# Patient Record
Sex: MALE | Race: WHITE | URBAN - METROPOLITAN AREA
[De-identification: names, ages, dates, MRNs, and addresses within clinical notes are randomized per-mention and may not be internally consistent; named-entity substitution may affect disease eponyms.]

---

## 2018-07-06 ENCOUNTER — OFFICE VISIT (OUTPATIENT)
Dept: INTERNAL MEDICINE | Facility: CLINIC | Age: 53
End: 2018-07-06
Payer: COMMERCIAL

## 2018-07-06 VITALS
RESPIRATION RATE: 20 BRPM | HEART RATE: 61 BPM | BODY MASS INDEX: 25.07 KG/M2 | SYSTOLIC BLOOD PRESSURE: 114 MMHG | HEIGHT: 69 IN | DIASTOLIC BLOOD PRESSURE: 60 MMHG | WEIGHT: 169.3 LBS | TEMPERATURE: 97.5 F | OXYGEN SATURATION: 100 %

## 2018-07-06 DIAGNOSIS — Z00.00 ENCOUNTER FOR ROUTINE ADULT HEALTH EXAMINATION WITHOUT ABNORMAL FINDINGS: Primary | ICD-10-CM

## 2018-07-06 DIAGNOSIS — M25.511 RIGHT SHOULDER PAIN, UNSPECIFIED CHRONICITY: ICD-10-CM

## 2018-07-06 PROCEDURE — 80048 BASIC METABOLIC PNL TOTAL CA: CPT | Performed by: INTERNAL MEDICINE

## 2018-07-06 PROCEDURE — G0103 PSA SCREENING: HCPCS | Performed by: INTERNAL MEDICINE

## 2018-07-06 PROCEDURE — 99396 PREV VISIT EST AGE 40-64: CPT | Performed by: INTERNAL MEDICINE

## 2018-07-06 PROCEDURE — 36415 COLL VENOUS BLD VENIPUNCTURE: CPT | Performed by: INTERNAL MEDICINE

## 2018-07-06 PROCEDURE — 80061 LIPID PANEL: CPT | Performed by: INTERNAL MEDICINE

## 2018-07-06 PROCEDURE — 99213 OFFICE O/P EST LOW 20 MIN: CPT | Mod: 25 | Performed by: INTERNAL MEDICINE

## 2018-07-06 NOTE — PATIENT INSTRUCTIONS
PREVENTIVE HEALTH RECOMMENDATIONS:     Vaccines: Get a flu shot each year. Get a tetanus shot every 10 years.     Exercise for at least 150 minutes a week (an average of 30 minutes a day, 5 days of the week). This will help you control your weight and prevent disease.    Limit alcohol to one drink per day.    No smoking.     Wear sunscreen to prevent skin cancer.     See your dentist twice a year for an exam and cleaning.    Try to get Calcium 1000 mg total per day. It is best to not take it all at once. Try to get Vitamin D at least 7961-8432 units per day.    BMI or Body Mass Index is a way of indicating weight and health risk for cardiovascular diseases, high blood pressure, diabetes.   Definitions:    Underweight is less than 18.5 and will be associated with health risk.   Normal BMI is 18.5 to 25   Overweight is 25-29   Obesity is 30 or greater   Morbid Obesity is 40 or greater or 35 or greater with diabetes, prediabetes or abnormal blood sugar, high blood pressure or elevated cholesterol  Obesity and Morbid Obesity are associated with higher health risks. Lowering calories, exercising more may lower your BMI and even small decreases can have positive impact on lowering health risks.   Your Body mass index is 25.37 kg/(m^2)..,

## 2018-07-06 NOTE — MR AVS SNAPSHOT
After Visit Summary   7/6/2018    Prashanth Looney    MRN: [id number]           Patient Information     Date Of Birth          1965        Visit Information        Provider Department      7/6/2018 11:00 AM Dana Da Silva MD [facility]        Today's Diagnoses     Encounter for routine adult health examination without abnormal findings    -  1      Care Instructions      PREVENTIVE HEALTH RECOMMENDATIONS:     Vaccines: Get a flu shot each year. Get a tetanus shot every 10 years.     Exercise for at least 150 minutes a week (an average of 30 minutes a day, 5 days of the week). This will help you control your weight and prevent disease.    Limit alcohol to one drink per day.    No smoking.     Wear sunscreen to prevent skin cancer.     See your dentist twice a year for an exam and cleaning.    Try to get Calcium 1000 mg total per day. It is best to not take it all at once. Try to get Vitamin D at least 5313-4121 units per day.    BMI or Body Mass Index is a way of indicating weight and health risk for cardiovascular diseases, high blood pressure, diabetes.   Definitions:    Underweight is less than 18.5 and will be associated with health risk.   Normal BMI is 18.5 to 25   Overweight is 25-29   Obesity is 30 or greater   Morbid Obesity is 40 or greater or 35 or greater with diabetes, prediabetes or abnormal blood sugar, high blood pressure or elevated cholesterol  Obesity and Morbid Obesity are associated with higher health risks. Lowering calories, exercising more may lower your BMI and even small decreases can have positive impact on lowering health risks.   Your Body mass index is 25.37 kg/(m^2)..,              Follow-ups after your visit        Who to contact     If you have questions or need follow up information about today's clinic visit or your schedule please contact [facility] directly at [phone].  Normal or non-critical lab and imaging results will be  "communicated to you by MyChart, letter or phone within 4 business days after the clinic has received the results. If you do not hear from us within 7 days, please contact the clinic through MyChart or phone. If you have a critical or abnormal lab result, we will notify you by phone as soon as possible.  Submit refill requests through [employer] or call your pharmacy and they will forward the refill request to us. Please allow 3 business days for your refill to be completed.          Additional Information About Your Visit        Care EveryWhere ID     This is your Care EveryWhere ID. This could be used by other organizations to access your [place] medical records  [id number]        Your Vitals Were     Pulse Temperature Respirations Height Pulse Oximetry BMI (Body Mass Index)    61 97.5  F (36.4  C) (Oral) 20 5' 8.5\" (1.74 m) 100% 25.37 kg/m2       Blood Pressure from Last 3 Encounters:   07/06/18 114/60   08/30/16 110/70   07/06/15 90/50    Weight from Last 3 Encounters:   07/06/18 169 lb 4.8 oz (76.8 kg)   08/30/16 160 lb (72.6 kg)   07/06/15 171 lb 8 oz (77.8 kg)              We Performed the Following     Basic metabolic panel     Lipid panel reflex to direct LDL Fasting     PSA, screen        Primary Care Provider Fax #    Physician No Ref-Primary [phone]       No address on file        Equal Access to Services     MADI MILLER : Hadii bolivar contreras Solitzy, waaxda luqadaha, qaybta kaalmaranda corbett . So [facility] [phone].    ATENCIÓN: Si habla español, tiene a marie disposición servicios gratuitos de asistencia lingüística. Hayley al [phone].    We comply with applicable federal civil rights laws and [place] laws. We do not discriminate on the basis of race, color, national origin, age, disability, sex, sexual orientation, or gender identity.            Thank you!     Thank you for choosing [facility]  for your care. Our goal is always to " provide you with excellent care. Hearing back from our patients is one way we can continue to improve our services. Please take a few minutes to complete the written survey that you may receive in the mail after your visit with us. Thank you!             Your Updated Medication List - Protect others around you: Learn how to safely use, store and throw away your medicines at www.disposemymeds.org.          This list is accurate as of 7/6/18 12:16 PM.  Always use your most recent med list.                   Brand Name Dispense Instructions for use Diagnosis    tadalafil 5 MG tablet    CIALIS    6 tablet    Take 1 tablet (5 mg) by mouth as needed for erectile dysfunction    Erectile dysfunction due to arterial insufficiency

## 2018-07-06 NOTE — PROGRESS NOTES
SUBJECTIVE:   CC: Prashanth Looney is an 53 year old male who presents for preventative health visit.     Physical   Annual:     Getting at least 3 servings of Calcium per day:  Yes    Bi-annual eye exam:  Yes    Dental care twice a year:  Yes    Sleep apnea or symptoms of sleep apnea:  None    Diet:  Regular (no restrictions)    Frequency of exercise:  4-5 days/week    Duration of exercise:  30-45 minutes    Taking medications regularly:  Yes    Medication side effects:  Not applicable    Additional concerns today:  No    Current concerns:  Right shoulder pain: Reports he has had pain in the right shoulder for for 5 months.  This is most often mild to moderate aching but sometimes can be a sharp pain, especially with pushing, lifting.  He had no specific injury.  He does fish a lot but it does not seem to particularly hurt while he is doing his fishing.  It occasionally can bother him at night when turning over.  It does not radiate into the arm, no associated neck pain.      Today's PHQ-2 Score:   PHQ-2 ( 1999 [employer]) 7/6/2018   Q1: Little interest or pleasure in doing things 0   Q2: Feeling down, depressed or hopeless 0   PHQ-2 Score 0   Q1: Little interest or pleasure in doing things Not at all   Q2: Feeling down, depressed or hopeless Not at all   PHQ-2 Score 0       Abuse: Current or Past(Physical, Sexual or Emotional)- No  Do you feel safe in your environment - Yes    Social History   Substance Use Topics     Smoking status: Never Smoker     Smokeless tobacco: Never Used     Alcohol use Yes      Comment: occ     Alcohol Use 7/6/2018   If you drink alcohol do you typically have greater than 3 drinks per day OR greater than 7 drinks per week? No       Last PSA:   PSA   Date Value Ref Range Status   08/30/2016 0.18 0 - 4 ug/L Final     Comment:     Assay Method:  Chemiluminescence using Siemens Vista analyzer       Reviewed orders with patient. Reviewed health maintenance and updated orders accordingly -  "Yes      Reviewed and updated as needed this visit by clinical staff  Tobacco  Allergies  Meds  Med Hx  Surg Hx  Fam Hx  Soc Hx        Reviewed and updated as needed this visit by Provider            Review of Systems        Physical Exam    Patient Active Problem List   Diagnosis     Hyperlipidemia LDL goal <130     Benign neoplasm of sigmoid colon     ED (erectile dysfunction)     Current Outpatient Prescriptions   Medication Sig Dispense Refill     tadalafil (CIALIS) 5 MG tablet Take 1 tablet (5 mg) by mouth as needed for erectile dysfunction (Patient not taking: Reported on 7/6/2018) 6 tablet 11        Review Of Systems  Skin: Negative, he sees Dr. Salinas regularly  Eyes: negative  Ears/Nose/Throat: negative  Respiratory: No dyspnea on exertion and No cough  Cardiovascular: negative  Gastrointestinal: negative  Genitourinary: negative  Musculoskeletal: as above  Neurologic: negative  Psychiatric: negative  Hematologic/Lymphatic/Immunologic: negative  Endocrine: negative      Objective:  Patient alert, in no acute distress  /60 (BP Location: Left arm, Patient Position: Sitting, Cuff Size: Adult Regular)  Pulse 61  Temp 97.5  F (36.4  C) (Oral)  Resp 20  Ht 5' 8.5\" (1.74 m)  Wt 169 lb 4.8 oz (76.8 kg)  SpO2 100%  BMI 25.37 kg/m2    HEENT: extraocular movements are intact, pupils equal and reactive to light and accommodation, TMs clear, oropharynx clear  NECK: Neck supple. No adenopathy. Thyroid symmetric, normal size,, Carotids without bruits.  PULMONARY: clear to auscultation  CARDIAC: S1, S2 normal, no murmur, rub or gallop, regular rate and rhythm  PULSES: 2/2 throughout  ABDOMINAL: Soft, nontender.  Normal bowel sounds.  No hepatosplenomegaly or abnormal masses  BREAST: male breasts are normal without masses  RECTAL: Normal sphincter tone, no masses, prostate mildly enlarged, without nodules  REFLEXES: 2+ throughout  SKIN unremarkable     Right shoulder: There is some tenderness at the " "supraspinatus tendon insertion, no anterior or posterior tenderness.  No tenderness of the AC joint.  Range of motion is full with very minimal pain at full abduction.  Rotation strength is normal, rest of the muscles are with good normal strength.      ASSESSMENT/PLAN:   1. Encounter for routine adult health examination without abnormal findings  He may need to have a form completed for biometric screening, advised that if a form is required for me to sign he will need to bring it in and sign a release of information  - Basic metabolic panel  - Lipid panel reflex to direct LDL Fasting  - PSA, screen    2. Right shoulder pain, unspecified chronicity  Likely this represents some tendinitis/impingement.  Advised strength is good so do not suspect rotator cuff tear, also there was no significant injury.  Advised his activities could have an effect on this.  He does not feels bad enough to consider orthopedic evaluation or injection at this time so given some handouts with shoulder exercises, advised if the exercises hurt to decrease use.  If he has worsening or persistent, he can call for referral to orthopedics.      COUNSELING:   Reviewed preventive health counseling, as reflected in patient instructions    BP Readings from Last 1 Encounters:   07/06/18 114/60     Estimated body mass index is 25.74 kg/(m^2) as calculated from the following:    Height as of this encounter: 5' 8\" (1.727 m).    Weight as of this encounter: 169 lb 4.8 oz (76.8 kg).           reports that he has never smoked. He has never used smokeless tobacco.      Counseling Resources:  ATP IV Guidelines  Pooled Cohorts Equation Calculator  FRAX Risk Assessment  ICSI Preventive Guidelines  Dietary Guidelines for Americans, 2010  USDA's MyPlate  ASA Prophylaxis  Lung CA Screening    Dana Da Silva MD  [facility]  Answers for HPI/ROS submitted by the patient on 7/6/2018   PHQ-2 Score: 0    "

## 2018-07-07 LAB
ANION GAP SERPL CALCULATED.3IONS-SCNC: 10 MMOL/L (ref 3–14)
BUN SERPL-MCNC: 11 MG/DL (ref 7–30)
CALCIUM SERPL-MCNC: 8.9 MG/DL (ref 8.5–10.1)
CHLORIDE SERPL-SCNC: 106 MMOL/L (ref 94–109)
CHOLEST SERPL-MCNC: 213 MG/DL
CO2 SERPL-SCNC: 23 MMOL/L (ref 20–32)
CREAT SERPL-MCNC: 0.71 MG/DL (ref 0.66–1.25)
GFR SERPL CREATININE-BSD FRML MDRD: >90 ML/MIN/1.7M2
GLUCOSE SERPL-MCNC: 90 MG/DL (ref 70–99)
HDLC SERPL-MCNC: 84 MG/DL
LDLC SERPL CALC-MCNC: 116 MG/DL
NONHDLC SERPL-MCNC: 129 MG/DL
POTASSIUM SERPL-SCNC: 4.3 MMOL/L (ref 3.4–5.3)
PSA SERPL-ACNC: 0.15 UG/L (ref 0–4)
SODIUM SERPL-SCNC: 139 MMOL/L (ref 133–144)
TRIGL SERPL-MCNC: 63 MG/DL

## 2019-01-22 ENCOUNTER — OFFICE VISIT (OUTPATIENT)
Dept: INTERNAL MEDICINE | Facility: CLINIC | Age: 54
End: 2019-01-22
Payer: COMMERCIAL

## 2019-01-22 VITALS
TEMPERATURE: 98.6 F | BODY MASS INDEX: 25.3 KG/M2 | HEIGHT: 69 IN | OXYGEN SATURATION: 98 % | WEIGHT: 170.8 LBS | SYSTOLIC BLOOD PRESSURE: 99 MMHG | RESPIRATION RATE: 16 BRPM | DIASTOLIC BLOOD PRESSURE: 62 MMHG | HEART RATE: 57 BPM
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
## 2019-01-22 DIAGNOSIS — S69.92XA INJURY OF FINGER OF LEFT HAND BY NAIL GUN, INITIAL ENCOUNTER: Primary | ICD-10-CM

## 2019-01-22 PROCEDURE — 99213 OFFICE O/P EST LOW 20 MIN: CPT | Performed by: INTERNAL MEDICINE

## 2019-01-22 RX ORDER — CEPHALEXIN 500 MG/1
500 CAPSULE ORAL 3 TIMES DAILY
Qty: 21 CAPSULE | Refills: 0 | Status: SHIPPED | OUTPATIENT
Start: 2019-01-22 | End: 2019-01-29

## 2019-01-22 ASSESSMENT — MIFFLIN-ST. JEOR: SCORE: 1596.15

## 2019-01-22 NOTE — NURSING NOTE
"BP 99/62   Pulse 57   Temp 98.6  F (37  C) (Oral)   Resp 16   Ht 1.746 m (5' 8.75\")   Wt 77.5 kg (170 lb 12.8 oz)   SpO2 98%   BMI 25.41 kg/m      "

## 2019-01-22 NOTE — PROGRESS NOTES
"  SUBJECTIVE:   Prashanth Looney is a 53 year old male who presents to clinic today for the following health issues:      He presents with injury to his left index finger.  He reports 2 days ago while using a power nail gun he forced a nail into his left index finger.  It went in immediately between the MCP and PIP and went straight down ending in the soft tissue between the PIP and DIP joint.  He believes it was only in the soft tissue as he was able to pull it out very easily.  He tried to rinse it and wrapped it but did not seek medical evaluation.  It started swelling that day.  He noticed some bruising on the palmar side of the finger yesterday.  He has been icing it.  The swelling is stable, it is difficult to bend the finger but he is able to bend it with out much pain.  There is been no erythema and no drainage.    His last tetanus shot was 8/16.     Patient Active Problem List   Diagnosis     Hyperlipidemia LDL goal <130     Benign neoplasm of sigmoid colon     ED (erectile dysfunction)     Current Outpatient Medications   Medication Sig Dispense Refill   none    Social History     Tobacco Use     Smoking status: Never Smoker     Smokeless tobacco: Never Used   Substance Use Topics     Alcohol use: Yes     Comment: occ     Drug use: No            Problem list and histories reviewed & adjusted, as indicated.  Additional history:         Reviewed and updated as needed this visit by clinical staff  Tobacco  Allergies  Meds  Med Hx  Surg Hx  Fam Hx  Soc Hx      Reviewed and updated as needed this visit by Provider         ROS:  No fever or chills    OBJECTIVE:     BP 99/62   Pulse 57   Temp 98.6  F (37  C) (Oral)   Resp 16   Ht 1.746 m (5' 8.75\")   Wt 77.5 kg (170 lb 12.8 oz)   SpO2 98%   BMI 25.41 kg/m    Body mass index is 25.41 kg/m .    Left index finger with a eschar at the radial side.  There is moderate swelling of the entire finger, mild diffuse tenderness, no erythema, warmth.  He is able to " flex the PIP and DIP joints very slightly      ASSESSMENT/PLAN:         1. Injury of finger of left hand by nail gun, initial encounter  By history of the nail apparently did not go into the bone, currently exam does not suggest there is any active infection but because of the type of injury and the fact it was not a addressed immediately would start on antibiotic to help prevent infection starting and recommend follow-up with orthopedics within the next couple days to monitor for more significant injury to the joint or bone and to help ensure no issues with infection develop.  Advised him to call the orthopedic  to help expedite the appointment.  Advised if there is sudden increase in pain, swelling, redness that he present to the ED for urgent evaluation.  - cephALEXin (KEFLEX) 500 MG capsule; Take 1 capsule (500 mg) by mouth 3 times daily for 7 days  Dispense: 21 capsule; Refill: 0  - ORTHO  REFERRAL        Dana Da Silva MD  [facility]

## 2019-01-23 ENCOUNTER — OFFICE VISIT (OUTPATIENT)
Dept: ORTHOPEDICS | Facility: CLINIC | Age: 54
End: 2019-01-23
Payer: COMMERCIAL

## 2019-01-23 ENCOUNTER — ANCILLARY PROCEDURE (OUTPATIENT)
Dept: GENERAL RADIOLOGY | Facility: CLINIC | Age: 54
End: 2019-01-23
Payer: COMMERCIAL

## 2019-01-23 VITALS
DIASTOLIC BLOOD PRESSURE: 72 MMHG | WEIGHT: 170 LBS | SYSTOLIC BLOOD PRESSURE: 116 MMHG | HEIGHT: 69 IN | BODY MASS INDEX: 25.18 KG/M2

## 2019-01-23 DIAGNOSIS — S69.92XA INJURY OF LEFT INDEX FINGER, INITIAL ENCOUNTER: ICD-10-CM

## 2019-01-23 DIAGNOSIS — M75.01 ADHESIVE CAPSULITIS OF RIGHT SHOULDER: ICD-10-CM

## 2019-01-23 DIAGNOSIS — S69.92XA INJURY OF LEFT INDEX FINGER, INITIAL ENCOUNTER: Primary | ICD-10-CM

## 2019-01-23 PROCEDURE — 99244 OFF/OP CNSLTJ NEW/EST MOD 40: CPT | Performed by: FAMILY MEDICINE

## 2019-01-23 PROCEDURE — 73140 X-RAY EXAM OF FINGER(S): CPT | Mod: LT | Performed by: FAMILY MEDICINE

## 2019-01-23 RX ORDER — MELOXICAM 15 MG/1
15 TABLET ORAL DAILY
Qty: 30 TABLET | Refills: 1 | Status: SHIPPED | OUTPATIENT
Start: 2019-01-23

## 2019-01-23 ASSESSMENT — MIFFLIN-ST. JEOR: SCORE: 1592.52

## 2019-01-23 NOTE — PROGRESS NOTES
ASSESSMENT & PLAN  Patient Instructions     1. Injury of left index finger, initial encounter    2. Adhesive capsulitis of right shoulder      -Patient has left index finger pain due to a puncture wound from a nail gun.  -Patient has excellent strength of his flexor and extensor tendons but limited flexion due to generalized swelling of the second digit.  -Patient will start meloxicam 15 mg p.o. daily and ice the finger as often as he can throughout the day.  Patient will complete his course of antibiotics.  -Patient will follow-up in 4 weeks for reevaluation and progression of activity.  -Patient also has right shoulder pain due to a frozen shoulder.  -Patient will start formal physical therapy and home exercise program.  He will also take meloxicam 50 mg p.o. daily for the right shoulder.  -Patient will follow-up in 4 weeks for reevaluation.  If no improvement in pain and range of motion, to consider cortisone injection at that time.  -Call direct clinic number [[phone]] at any time with questions or concerns.    Albert Yeo MD [facility]  [facility]          -----    SUBJECTIVE  Prashanth Looney is a/an 53 year old Right handed male who is seen in consultation at the request of  Dana Da Silva M.D. for evaluation of left index finger pain. The patient is seen by themselves.    Onset: 1/20/19. Patient describes injury as he was using a nail gun when he accidentally shot a nail into the left index finger. Nail went into the finger parallel to the bone.  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  Patient states pain began after fall.  Patient reports progressive loss of range of motion of the right shoulder and pain with activity.  Pain is sharp, nonradiating.    Past Medical History:   Diagnosis Date     Tubular adenoma      Social History     Socioeconomic History     Marital status: Single     Spouse name: Not on file     Number of children: Not on file     Years of education: Not on file     Highest education level: Not on file   Social Needs     Financial resource strain: Not on file     Food insecurity - worry: Not on file     Food insecurity - inability: Not on file     Transportation needs - medical: Not on file     Transportation needs - non-medical: Not on file   Occupational History     Not on file   Tobacco Use     Smoking status: Never Smoker     Smokeless tobacco: Never Used   Substance and Sexual Activity     Alcohol use: Yes     Comment: occ     Drug use: No     Sexual activity: Yes     Partners: Female   Other Topics Concern     Parent/sibling w/ CABG, MI or angioplasty before 65F 55M? Not Asked   Social History Narrative     Not on file         Patient's past medical, surgical, social, and family histories were reviewed today and no changes are noted.    REVIEW OF SYSTEMS:  10 point ROS is negative other than symptoms noted above in HPI, Past Medical History or as stated below  Constitutional: NEGATIVE for fever, chills, change in weight  Skin: NEGATIVE for worrisome rashes, moles or lesions  GI/: NEGATIVE for bowel or bladder changes  Neuro: NEGATIVE for weakness, dizziness or paresthesias    OBJECTIVE:  /72   Ht 1.746 m (5' 8.75\")   Wt 77.1 kg (170 lb)   BMI 25.29 kg/m     General: healthy, alert and in no distress  HEENT: no scleral icterus or conjunctival erythema  Skin: no suspicious lesions or rash. No jaundice.  CV: regular " rhythm by palpation  Resp: normal respiratory effort without conversational dyspnea   Psych: normal mood and affect  Gait: normal steady gait with appropriate coordination and balance  Neuro: normal light touch sensory exam of the bilateral hands.    MSK:  LEFT HAND  Inspection:  Generalized swelling around the entire second digit.  No erythema, warmth, induration or discharge.  A well-healed scab at the PIP joint.  Palpation:   Carpals: normal   Metacarpals: normal   Thumb: normal   Fingers: Nontender.  Range of Motion:    flexion PIP limited by tightness due to swelling, flexion DIP limited by tightness due to swelling  Strength:  5/5 in all directions  Special Tests:    Positive: none    Negative: none    RIGHT SHOULDER  Inspection:    no swelling, bruising, discoloration, or obvious deformity or asymmetry  Palpation:     bony and tendinous landmarks are nontender.    Crepitus is Absent  Active Range of Motion:     Abduction 900 / FF 1200 /  / IR SI joint.  Opposite arm abduction/flexion/ER within normal limits, IR normal    Scapular dyskinesis absent  Strength:    Scapular plane abduction 5/5 / ER 5/5 / IR 5/5 / biceps 5/5 / triceps 5/5  Special Tests:    Positive: Neer's and Dial'    Negative: supraspinatus (empty can), drop arm/painful arc, crossed arm adduction, Dugway's and Speed's      Independent visualization of the below image:  Recent Results (from the past 24 hour(s))   XR Finger Left G/E 2 Views    Narrative    No acute fracture, dislocation, bony abnormalities.  Generalized soft   tissue swelling around the second digit.  No foreign body visible.             Albert Yeo MD [facility]

## 2019-01-23 NOTE — PATIENT INSTRUCTIONS
1. Injury of left index finger, initial encounter    2. Adhesive capsulitis of right shoulder      -Patient has left index finger pain due to a puncture wound from a nail gun.  -Patient has excellent strength of his flexor and extensor tendons but limited flexion due to generalized swelling of the second digit.  -Patient will start meloxicam 15 mg p.o. daily and ice the finger as often as he can throughout the day.  Patient will complete his course of antibiotics.  -Patient will follow-up in 4 weeks for reevaluation and progression of activity.  -Patient also has right shoulder pain due to a frozen shoulder.  -Patient will start formal physical therapy and home exercise program.  He will also take meloxicam 50 mg p.o. daily for the right shoulder.  -Patient will follow-up in 4 weeks for reevaluation.  If no improvement in pain and range of motion, to consider cortisone injection at that time.  -Call direct clinic number [[phone]] at any time with questions or concerns.    Albert Yeo MD [facility]  [facility] [place]

## 2019-01-28 NOTE — PROGRESS NOTES
[facility]: Physical Therapy Initial Evaluation   Jan 29, 2019  Subjective:   Chief Complaint: Right shoulder pain/poor motion   Pain: right lateral shoulder pain.    Numbness/Tingling: None   Weakness: None   Stiffness: Not really  New/Recurrent/Chronic: Chronic  DOI/onset: 1 year ago   Referral Date: 1/23/2019 - Dr. Albert Yeo  Mechanism of onset: Slipped on the ice and landed on the shoulder  PMH/surgical history/trauma: No significant medical history  General health as reported by patient: Excellent    Medications: anti-inflammatory, just finished an anti-biotic  Occupation: Sales management Job duties: driving, computer work  Previous Treatment (Effect): Has a brace for when he's fishing, has moved away from weights,   Imaging: No imaging for the shoulder on file  AM/PM: based on when he does something  Quality of Pain: sharp, can linger  Pain: 0/10 at present, 0/10 at best, 7/10 at worst  Worse: reaching up or back,   Better: let it rest,   Progression of Symptoms since onset: about the same   Hx of Falls: just the slip on the ice that started this condition  Sleeping: no disturbance   Current Functional Status: reaching - removing his wallet, getting dressed,  ; basketball - trouble shooting baskets  Previous Functional Status: no restrictions  Current HEP/exercise regimen: cardio training, has moved away from weight training  Hand/Leg Dominance: right-handed  Transportation to Therapy: Independent with transportation  Red Flags:   - Patient denies the following: Night Pain ; Weakness ; Numbness/Tingling ; Chest Pain ;     Patient's Goal(s): Be able to reach and move the arm      Objective:    Standing Posture: Mild kyphotic posture ; prominence of the inferior angle, left more than right.     Shoulder: (* indicates patient's pain)   AROM R AROM L MMT R MMT L   Flex/  Elevation 130 145 5 5   Abd 110 142 5-* 5   IR   5 5   ER 57 65 5-* 5   IR/Ext L5 T8       Scapulothoracic Rhythm:  Excessive and early scapular recruitment    Palpation: No tenderness to palpation at the junction of the acromion/humeral head where the pain is reported.     GH/Capsular Mobility  R L   Posterior glide hypo Mild hypo   Inferior glide hypo Mild hypo   Anterior glide  Mild hypo       Assessment/Plan:    Patient is a 53 year old male with right side shoulder complaints.    Patient has the following significant findings with corresponding treatment plan.                Referring Diagnosis: Adhesive capsulitis of right shoulder   Pain -  hot/cold therapy, manual therapy, splint/taping/bracing/orthotics, self management, education and home program  Decreased ROM/flexibility - manual therapy, therapeutic exercise, therapeutic activity and home program  Decreased joint mobility - manual therapy, therapeutic exercise, therapeutic activity and home program  Decreased strength - therapeutic exercise, therapeutic activities and home program  Decreased function - therapeutic activities and home program  Impaired posture - neuro re-education, therapeutic activities and home program      Therapy Evaluation Codes:   1) History comprised of:   Personal factors that impact the plan of care:      None.    Comorbidity factors that impact the plan of care are:      None.     Medications impacting care: Anti-inflammatory.  2) Examination of Body Systems comprised of:   Body structures and functions that impact the plan of care:      Shoulder and Thoracic Spine.   Activity limitations that impact the plan of care are:      Sports and Reaching.  3) Clinical presentation characteristics are:   Stable/Uncomplicated.  4) Decision-Making    Low complexity using standardized patient assessment instrument and/or measureable assessment of functional outcome.  Cumulative Therapy Evaluation is: Low complexity.    Previous and current functional limitations:  (See Goal Flow Sheet for this information)    Short term and Long term goals: (See Goal  Flow Sheet for this information)     Communication ability:  Patient appears to be able to clearly communicate and understand verbal and written communication and follow directions correctly.  Treatment Explanation - The following has been discussed with the patient:   RX ordered/plan of care  Anticipated outcomes  Possible risks and side effects  This patient would benefit from PT intervention to resume normal activities.   Rehab potential is excellent.    Frequency:  1 X week, once daily  Duration:  for 4 weeks tapering to 2 X a month over 4 weeks  Discharge Plan:  Achieve all LTG.  Independent in home treatment program.  Reach maximal therapeutic benefit.    Please refer to the daily flowsheet for treatment today, total treatment time and time spent performing 1:1 timed codes.

## 2019-01-29 ENCOUNTER — THERAPY VISIT (OUTPATIENT)
Dept: PHYSICAL THERAPY | Facility: CLINIC | Age: 54
End: 2019-01-29
Payer: COMMERCIAL

## 2019-01-29 DIAGNOSIS — M75.01 ADHESIVE CAPSULITIS OF RIGHT SHOULDER: ICD-10-CM

## 2019-01-29 PROCEDURE — 97110 THERAPEUTIC EXERCISES: CPT | Mod: GP | Performed by: PHYSICAL THERAPIST

## 2019-01-29 PROCEDURE — 97140 MANUAL THERAPY 1/> REGIONS: CPT | Mod: GP | Performed by: PHYSICAL THERAPIST

## 2019-01-29 PROCEDURE — 97161 PT EVAL LOW COMPLEX 20 MIN: CPT | Mod: GP | Performed by: PHYSICAL THERAPIST

## 2019-02-05 ENCOUNTER — THERAPY VISIT (OUTPATIENT)
Dept: PHYSICAL THERAPY | Facility: CLINIC | Age: 54
End: 2019-02-05
Payer: COMMERCIAL

## 2019-02-05 DIAGNOSIS — M75.01 ADHESIVE CAPSULITIS OF RIGHT SHOULDER: ICD-10-CM

## 2019-02-05 PROCEDURE — 97110 THERAPEUTIC EXERCISES: CPT | Mod: GP | Performed by: PHYSICAL THERAPIST

## 2019-02-05 PROCEDURE — 97140 MANUAL THERAPY 1/> REGIONS: CPT | Mod: GP | Performed by: PHYSICAL THERAPIST

## 2019-02-12 ENCOUNTER — THERAPY VISIT (OUTPATIENT)
Dept: PHYSICAL THERAPY | Facility: CLINIC | Age: 54
End: 2019-02-12
Payer: COMMERCIAL

## 2019-02-12 DIAGNOSIS — M75.01 ADHESIVE CAPSULITIS OF RIGHT SHOULDER: ICD-10-CM

## 2019-02-12 PROCEDURE — 97140 MANUAL THERAPY 1/> REGIONS: CPT | Mod: GP | Performed by: PHYSICAL THERAPIST

## 2019-02-12 PROCEDURE — 97110 THERAPEUTIC EXERCISES: CPT | Mod: GP | Performed by: PHYSICAL THERAPIST

## 2019-02-12 PROCEDURE — 97112 NEUROMUSCULAR REEDUCATION: CPT | Mod: GP | Performed by: PHYSICAL THERAPIST

## 2019-02-26 ENCOUNTER — THERAPY VISIT (OUTPATIENT)
Dept: PHYSICAL THERAPY | Facility: CLINIC | Age: 54
End: 2019-02-26
Payer: COMMERCIAL

## 2019-02-26 DIAGNOSIS — M75.01 ADHESIVE CAPSULITIS OF RIGHT SHOULDER: ICD-10-CM

## 2019-02-26 PROCEDURE — 97140 MANUAL THERAPY 1/> REGIONS: CPT | Mod: GP | Performed by: PHYSICAL THERAPIST

## 2019-02-26 PROCEDURE — 97110 THERAPEUTIC EXERCISES: CPT | Mod: GP | Performed by: PHYSICAL THERAPIST

## 2019-03-19 ENCOUNTER — THERAPY VISIT (OUTPATIENT)
Dept: PHYSICAL THERAPY | Facility: CLINIC | Age: 54
End: 2019-03-19
Payer: COMMERCIAL

## 2019-03-19 DIAGNOSIS — M75.01 ADHESIVE CAPSULITIS OF RIGHT SHOULDER: ICD-10-CM

## 2019-03-19 PROCEDURE — 97140 MANUAL THERAPY 1/> REGIONS: CPT | Mod: GP | Performed by: PHYSICAL THERAPIST

## 2019-03-19 PROCEDURE — 97110 THERAPEUTIC EXERCISES: CPT | Mod: GP | Performed by: PHYSICAL THERAPIST

## 2019-03-19 NOTE — PROGRESS NOTES
DISCHARGE  REPORT    Progress reporting period is from Jan 29, 2019 to Mar 19, 2019.     SUBJECTIVE  Subjective changes noted by patient: Doing well. The right shoulder doesn't really bother him at all. Reprots 90+% improvement. Feels ready to be done with therapy.   Current pain level is 0/10  .     Initial Pain level: 7/10.   Changes in function:  Yes (See Goal flowsheet attached for changes in current functional level)  Adverse reaction to treatment or activity: None    OBJECTIVE  Changes noted in objective findings:  Yes, Patient demonstrates improvements in ROM and is pain-free with strength testing.       Shoulder: (* indicates patient's pain)   AROM R MMT R   Flex/  Elevation 140    Abd 137 5   ER 60 5   IR/Ext T12/L1            ASSESSMENT/PLAN  Updated problem list and treatment plan: Diagnosis 1:  Adhesive capsulitis of right shoulder   Decreased ROM/flexibility - manual therapy, therapeutic exercise, therapeutic activity and home program  Decreased joint mobility - manual therapy, therapeutic exercise, therapeutic activity and home program  Decreased strength - therapeutic exercise, therapeutic activities and home program  Decreased function - therapeutic activities and home program  Impaired posture - neuro re-education, therapeutic activities and home program    STG/LTGs have been met or progress has been made towards goals:  Yes (See Goal flow sheet completed today.)  Assessment of Progress: The patient's condition is improving.  Patient is meeting short term goals and is progressing towards long term goals.  Self Management Plans:  Patient is independent in a home treatment program.  Patient is independent in self management of symptoms.  I have re-evaluated this patient and find that the nature, scope, duration and intensity of the therapy is appropriate for the medical condition of the patient.  Prashanth continues to require the following intervention to meet STG and LTG's:  PT intervention is no  longer required to meet STG/LTG.    Recommendations:  This patient is ready to be discharged from therapy and continue their home treatment program.  Patient will contact therapy if issues arise with new spring/summer activities such as basketball/fishing.     Please refer to the daily flowsheet for treatment today, total treatment time and time spent performing 1:1 timed codes.

## 2019-03-27 PROBLEM — M75.01 ADHESIVE CAPSULITIS OF RIGHT SHOULDER: Status: RESOLVED | Noted: 2019-01-29 | Resolved: 2019-03-27

## 2023-05-22 NOTE — LETTER
1/23/2019         RE: Prashanth Looney  [address]  [facility] [address]        Dear Colleague,    Thank you for referring your patient, Prashanth Looney, to the [facility]. Please see a copy of my visit note below.    ASSESSMENT & PLAN  Patient Instructions     1. Injury of left index finger, initial encounter    2. Adhesive capsulitis of right shoulder      -Patient has left index finger pain due to a puncture wound from a nail gun.  -Patient has excellent strength of his flexor and extensor tendons but limited flexion due to generalized swelling of the second digit.  -Patient will start meloxicam 15 mg p.o. daily and ice the finger as often as he can throughout the day.  Patient will complete his course of antibiotics.  -Patient will follow-up in 4 weeks for reevaluation and progression of activity.  -Patient also has right shoulder pain due to a frozen shoulder.  -Patient will start formal physical therapy and home exercise program.  He will also take meloxicam 50 mg p.o. daily for the right shoulder.  -Patient will follow-up in 4 weeks for reevaluation.  If no improvement in pain and range of motion, to consider cortisone injection at that time.  -Call direct clinic number [[phone]] at any time with questions or concerns.    Albert Yeo MD [facility]  [facility]          -----    SUBJECTIVE  Prashanth Looney is a/an 53 year old Right handed male who is seen in consultation at the request of  Dana Da Silva M.D. for evaluation of left index finger pain. The patient is seen by themselves.    Onset: 1/20/19. Patient describes injury as he was using a nail gun when he accidentally shot a nail into the left index finger. Nail went into the finger parallel to the bone.  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  Patient states pain began after fall.  Patient reports progressive loss of range of motion of the right shoulder and pain with activity.  Pain is sharp, nonradiating.    Past Medical History:   Diagnosis Date     Tubular adenoma      Social History     Socioeconomic History     Marital status: Single     Spouse name: Not on file     Number of children: Not on file     Years of education: Not on file     Highest education level: Not on file   Social Needs     Financial resource strain: Not on file     Food insecurity - worry: Not on file     Food insecurity - inability: Not on file     Transportation needs - medical: Not on file     Transportation needs - non-medical: Not on file   Occupational History     Not on file   Tobacco Use     Smoking status: Never Smoker     Smokeless tobacco: Never Used   Substance and Sexual Activity     Alcohol use: Yes     Comment: occ     Drug use: No     Sexual activity: Yes     Partners: Female   Other Topics Concern     Parent/sibling w/ CABG, MI or angioplasty before 65F 55M? Not Asked   Social History Narrative     Not on file         Patient's past medical, surgical, social, and family histories were reviewed today and no changes are noted.    REVIEW OF SYSTEMS:  10 point ROS is negative other than symptoms noted above in HPI, Past Medical History or as stated below  Constitutional: NEGATIVE for fever, chills, change in weight  Skin: NEGATIVE for worrisome rashes, moles or lesions  GI/: NEGATIVE for bowel or bladder changes  Neuro: NEGATIVE for weakness, dizziness or  "paresthesias    OBJECTIVE:  /72   Ht 1.746 m (5' 8.75\")   Wt 77.1 kg (170 lb)   BMI 25.29 kg/m      General: healthy, alert and in no distress  HEENT: no scleral icterus or conjunctival erythema  Skin: no suspicious lesions or rash. No jaundice.  CV: regular rhythm by palpation  Resp: normal respiratory effort without conversational dyspnea   Psych: normal mood and affect  Gait: normal steady gait with appropriate coordination and balance  Neuro: normal light touch sensory exam of the bilateral hands.    MSK:  LEFT HAND  Inspection:  Generalized swelling around the entire second digit.  No erythema, warmth, induration or discharge.  A well-healed scab at the PIP joint.  Palpation:   Carpals: normal   Metacarpals: normal   Thumb: normal   Fingers: Nontender.  Range of Motion:    flexion PIP limited by tightness due to swelling, flexion DIP limited by tightness due to swelling  Strength:  5/5 in all directions  Special Tests:    Positive: none    Negative: none    RIGHT SHOULDER  Inspection:    no swelling, bruising, discoloration, or obvious deformity or asymmetry  Palpation:     bony and tendinous landmarks are nontender.    Crepitus is Absent  Active Range of Motion:     Abduction 900 / FF 1200 /  / IR SI joint.  Opposite arm abduction/flexion/ER within normal limits, IR normal    Scapular dyskinesis absent  Strength:    Scapular plane abduction 5/5 / ER 5/5 / IR 5/5 / biceps 5/5 / triceps 5/5  Special Tests:    Positive: Neer's and Dial'    Negative: supraspinatus (empty can), drop arm/painful arc, crossed arm adduction, Chugach's and Speed's      Independent visualization of the below image:  Recent Results (from the past 24 hour(s))   XR Finger Left G/E 2 Views    Narrative    No acute fracture, dislocation, bony abnormalities.  Generalized soft   tissue swelling around the second digit.  No foreign body visible.             Albert Yeo MD [facility]      Again, " thank you for allowing me to participate in the care of your patient.        Sincerely,        Albert Yeo, MD     Cyclosporine Counseling:  I discussed with the patient the risks of cyclosporine including but not limited to hypertension, gingival hyperplasia,myelosuppression, immunosuppression, liver damage, kidney damage, neurotoxicity, lymphoma, and serious infections. The patient understands that monitoring is required including baseline blood pressure, CBC, CMP, lipid panel and uric acid, and then 1-2 times monthly CMP and blood pressure.